# Patient Record
Sex: MALE | Race: WHITE | NOT HISPANIC OR LATINO | URBAN - METROPOLITAN AREA
[De-identification: names, ages, dates, MRNs, and addresses within clinical notes are randomized per-mention and may not be internally consistent; named-entity substitution may affect disease eponyms.]

---

## 2017-08-07 ENCOUNTER — OFFICE (OUTPATIENT)
Dept: URBAN - METROPOLITAN AREA CLINIC 67 | Facility: CLINIC | Age: 52
End: 2017-08-07

## 2017-08-07 VITALS
HEART RATE: 82 BPM | WEIGHT: 220 LBS | HEIGHT: 72 IN | SYSTOLIC BLOOD PRESSURE: 118 MMHG | DIASTOLIC BLOOD PRESSURE: 76 MMHG

## 2017-08-07 DIAGNOSIS — Z12.11 SCREENING FOR COLON CANCER: ICD-10-CM

## 2017-08-07 PROCEDURE — 99203 OFFICE O/P NEW LOW 30 MIN: CPT | Performed by: INTERNAL MEDICINE

## 2017-08-07 NOTE — SERVICEHPINOTES
I had the pleasure of seeing this patient today.As you know, he is a 52-year-old man, asymptomatic, who comes to see me for screening colonoscopy.He denies any nausea, vomiting, hematemesis, melena, hematochezia, or unintentional weight loss.

## 2020-01-02 ENCOUNTER — OFFICE (OUTPATIENT)
Dept: URBAN - METROPOLITAN AREA CLINIC 67 | Facility: CLINIC | Age: 55
End: 2020-01-02

## 2020-01-02 VITALS — SYSTOLIC BLOOD PRESSURE: 120 MMHG | HEIGHT: 72 IN | WEIGHT: 229 LBS | DIASTOLIC BLOOD PRESSURE: 80 MMHG

## 2020-01-02 DIAGNOSIS — R10.13 EPIGASTRIC PAIN: ICD-10-CM

## 2020-01-02 PROCEDURE — 99243 OFF/OP CNSLTJ NEW/EST LOW 30: CPT | Performed by: NURSE PRACTITIONER

## 2020-01-02 NOTE — SERVICEHPINOTES
Patient is a 55 yo male patient who apparently went to an ER   3-4 months ago to be evaluated for SOB was told he has GERD. Was commenced on a PPI with subsequent dose escalation per PCP's recommendations. Has been on 40 mg omeprazole for 3 months. Continues to report intermittent epigastric pain lasting 2-3 days at a time despite PPIs and diet modifications. Denies N/V, black stools, wt loss. Previously seen for a screening colonoscopy in 2017 6 mm splenic flexure polyp removed by bx path failed to reveal polyp or adenoma.

## 2020-01-03 LAB
HEPATIC FUNCTION PANEL: ALBUMIN/GLOBULIN RATIO: 1.6 (CALC) (ref 1–2.5)
HEPATIC FUNCTION PANEL: ALBUMIN: 4.7 G/DL (ref 3.6–5.1)
HEPATIC FUNCTION PANEL: ALKALINE PHOSPHATASE: 73 U/L (ref 40–115)
HEPATIC FUNCTION PANEL: ALT: 36 U/L (ref 9–46)
HEPATIC FUNCTION PANEL: AST: 21 U/L (ref 10–35)
HEPATIC FUNCTION PANEL: BILIRUBIN, DIRECT: 0.1 MG/DL (ref ?–0.2)
HEPATIC FUNCTION PANEL: BILIRUBIN, INDIRECT: 0.9 MG/DL (CALC) (ref 0.2–1.2)
HEPATIC FUNCTION PANEL: BILIRUBIN, TOTAL: 1 MG/DL (ref 0.2–1.2)
HEPATIC FUNCTION PANEL: GLOBULIN: 3 G/DL (CALC) (ref 1.9–3.7)
HEPATIC FUNCTION PANEL: PROTEIN, TOTAL: 7.7 G/DL (ref 6.1–8.1)
LIPASE: 25 U/L (ref 7–60)

## 2020-01-28 ENCOUNTER — AMBULATORY SURGICAL CENTER (OUTPATIENT)
Dept: URBAN - METROPOLITAN AREA SURGERY 42 | Facility: SURGERY | Age: 55
End: 2020-01-28

## 2020-01-28 VITALS
DIASTOLIC BLOOD PRESSURE: 76 MMHG | TEMPERATURE: 98.4 F | RESPIRATION RATE: 18 BRPM | HEIGHT: 72 IN | SYSTOLIC BLOOD PRESSURE: 131 MMHG | HEART RATE: 87 BPM | OXYGEN SATURATION: 97 % | WEIGHT: 200 LBS

## 2020-01-28 DIAGNOSIS — K29.70 GASTRITIS, UNSPECIFIED, WITHOUT BLEEDING: ICD-10-CM

## 2020-01-28 DIAGNOSIS — K44.9 DIAPHRAGMATIC HERNIA WITHOUT OBSTRUCTION OR GANGRENE: ICD-10-CM

## 2020-01-28 LAB — SURGICAL: PDF REPORT: (no result)

## 2020-01-28 PROCEDURE — 43239 EGD BIOPSY SINGLE/MULTIPLE: CPT | Performed by: INTERNAL MEDICINE

## 2020-01-28 NOTE — SERVICEHPINOTES
Patient is a 53 yo male patient who apparently went to an ER 3-4 months ago to be evaluated for SOB was told he has GERD. Was commenced on a PPI with subsequent dose escalation per PCP's recommendations. Has been on 40 mg omeprazole for 3 months. Continues to report intermittent epigastric pain lasting 2-3 days at a time despite PPIs and diet modifications. Denies N/V, black stools, wt loss. Previously seen for a screening colonoscopy in 2017 6 mm splenic flexure polyp removed by bx path failed to reveal polyp or adenoma.

## 2020-02-06 ENCOUNTER — OFFICE (OUTPATIENT)
Dept: URBAN - METROPOLITAN AREA CLINIC 67 | Facility: CLINIC | Age: 55
End: 2020-02-06

## 2020-02-06 VITALS — HEIGHT: 72 IN | SYSTOLIC BLOOD PRESSURE: 110 MMHG | WEIGHT: 230 LBS | DIASTOLIC BLOOD PRESSURE: 80 MMHG

## 2020-02-06 DIAGNOSIS — K21.9 GERD: ICD-10-CM

## 2020-02-06 DIAGNOSIS — K22.70 BARRETT'S ESOPHAGUS: ICD-10-CM

## 2020-02-06 PROCEDURE — 99213 OFFICE O/P EST LOW 20 MIN: CPT

## 2020-02-06 NOTE — SERVICEHPINOTES
I had the pleasure of seeing this patient today.As you know, he is a 54-year-old man with a history of reflux disease who underwent upper endoscopy by me on January 28, 2020. 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 The endoscopy also revealed a small hiatal hernia. The patient comes to see me today quite concerned about these findings and wants to have a discussion regarding him.He denies any nausea, vomiting, hematemesis, melena, hematochezia, or unintentional weight loss.

## 2023-03-22 ENCOUNTER — OFFICE (OUTPATIENT)
Dept: URBAN - METROPOLITAN AREA CLINIC 67 | Facility: CLINIC | Age: 58
End: 2023-03-22

## 2023-03-22 VITALS — DIASTOLIC BLOOD PRESSURE: 80 MMHG | SYSTOLIC BLOOD PRESSURE: 120 MMHG | WEIGHT: 238 LBS | HEIGHT: 72 IN

## 2023-03-22 DIAGNOSIS — K22.70 BARRETT'S ESOPHAGUS: ICD-10-CM

## 2023-03-22 DIAGNOSIS — G47.33 OSA (OBSTRUCTIVE SLEEP APNEA): ICD-10-CM

## 2023-03-22 DIAGNOSIS — K21.9 GERD: ICD-10-CM

## 2023-03-22 DIAGNOSIS — Z86.010 PERSONAL HISTORY COLON POLYPS: ICD-10-CM

## 2023-03-22 PROCEDURE — 99213 OFFICE O/P EST LOW 20 MIN: CPT | Performed by: NURSE PRACTITIONER

## 2023-03-22 RX ORDER — PANTOPRAZOLE 40 MG/1
TABLET, DELAYED RELEASE ORAL
Qty: 30 | Status: ACTIVE
Start: 2023-03-22

## 2023-03-22 NOTE — SERVICEHPINOTES
JORGE L GIL   returns today for follow-up from last visit on   2/6/2020  .    Followed for a hx of GERD and was diagnosed w/ Knight's in 2020. A 3 yr surveillance was recommended. Also had a polyp on his screening colonoscopy in 2017, a 5 yr f/u was recommended- path wo polyp poss sampling error?
br
br   Asymptomatic on daily PPI. Also denies change in BH, rectal bleeding, wt loss. 
br
br Pt w/ cochlear implant, reads lips to communicate

## 2023-04-20 ENCOUNTER — AMBULATORY SURGICAL CENTER (OUTPATIENT)
Dept: URBAN - METROPOLITAN AREA SURGERY 42 | Facility: SURGERY | Age: 58
End: 2023-04-20

## 2023-04-20 VITALS
HEIGHT: 72 IN | DIASTOLIC BLOOD PRESSURE: 84 MMHG | OXYGEN SATURATION: 98 % | HEART RATE: 87 BPM | RESPIRATION RATE: 17 BRPM | WEIGHT: 215 LBS | TEMPERATURE: 97.9 F | SYSTOLIC BLOOD PRESSURE: 133 MMHG

## 2023-04-20 DIAGNOSIS — K22.89 OTHER SPECIFIED DISEASE OF ESOPHAGUS: ICD-10-CM

## 2023-04-20 DIAGNOSIS — Z86.010 PERSONAL HISTORY OF COLONIC POLYPS: ICD-10-CM

## 2023-04-20 PROBLEM — K64.1 SECOND DEGREE HEMORRHOIDS: Status: ACTIVE | Noted: 2023-04-20

## 2023-04-20 PROBLEM — K63.5 POLYP OF COLON: Status: ACTIVE | Noted: 2023-04-20

## 2023-04-20 LAB — SURGICAL: PDF REPORT: (no result)

## 2023-04-20 PROCEDURE — 45385 COLONOSCOPY W/LESION REMOVAL: CPT | Performed by: STUDENT IN AN ORGANIZED HEALTH CARE EDUCATION/TRAINING PROGRAM

## 2023-04-20 PROCEDURE — 43239 EGD BIOPSY SINGLE/MULTIPLE: CPT | Performed by: STUDENT IN AN ORGANIZED HEALTH CARE EDUCATION/TRAINING PROGRAM

## 2023-04-20 RX ORDER — PANTOPRAZOLE 40 MG/1
TABLET, DELAYED RELEASE ORAL
Qty: 30 | Status: ACTIVE
Start: 2023-03-22

## 2023-04-20 NOTE — SERVICEHPINOTES
JORGE L GIL returns today for follow-up from last visit on 2/6/2020. Followed for a hx of GERD and was diagnosed w/ Knight's in 2020. A 3 yr surveillance was recommended. Also had a polyp on his screening colonoscopy in 2017, a 5 yr f/u was recommended- path wo polyp poss sampling error?Asymptomatic on daily PPI. Also denies change in BH, rectal bleeding, wt loss.Pt w/ cochlear implant, reads lips to communicate  No

## 2023-10-16 ENCOUNTER — HOSPITAL ENCOUNTER (EMERGENCY)
Dept: HOSPITAL 54 - ER | Age: 58
Discharge: HOME | End: 2023-10-16
Payer: MEDICARE

## 2023-10-16 VITALS — OXYGEN SATURATION: 100 % | SYSTOLIC BLOOD PRESSURE: 131 MMHG | DIASTOLIC BLOOD PRESSURE: 96 MMHG | TEMPERATURE: 98.4 F

## 2023-10-16 VITALS — WEIGHT: 215 LBS | HEIGHT: 72 IN | BODY MASS INDEX: 29.12 KG/M2

## 2023-10-16 DIAGNOSIS — Z20.822: ICD-10-CM

## 2023-10-16 DIAGNOSIS — Z60.2: ICD-10-CM

## 2023-10-16 DIAGNOSIS — R06.02: Primary | ICD-10-CM

## 2023-10-16 DIAGNOSIS — K21.9: ICD-10-CM

## 2023-10-16 LAB
BASOPHILS # BLD AUTO: 0 K/UL (ref 0–0.2)
BASOPHILS NFR BLD AUTO: 0.4 % (ref 0–2)
BUN SERPL-MCNC: 15 MG/DL (ref 7–18)
CALCIUM SERPL-MCNC: 8.8 MG/DL (ref 8.5–10.1)
CHLORIDE SERPL-SCNC: 104 MMOL/L (ref 98–107)
CO2 SERPL-SCNC: 26 MMOL/L (ref 21–32)
CREAT SERPL-MCNC: 1 MG/DL (ref 0.6–1.3)
EOSINOPHIL # BLD AUTO: 0.1 K/UL (ref 0–0.7)
EOSINOPHIL NFR BLD AUTO: 1.5 % (ref 0–6)
ERYTHROCYTE [DISTWIDTH] IN BLOOD BY AUTOMATED COUNT: 13.5 % (ref 11.5–15)
GLUCOSE SERPL-MCNC: 120 MG/DL (ref 74–106)
HCT VFR BLD AUTO: 41 % (ref 39–51)
HGB BLD-MCNC: 14.3 G/DL (ref 13.5–17.5)
LYMPHOCYTES NFR BLD AUTO: 1.4 K/UL (ref 0.8–4.8)
LYMPHOCYTES NFR BLD AUTO: 30.4 % (ref 20–44)
MCH RBC QN AUTO: 31 PG (ref 26–33)
MCHC RBC AUTO-ENTMCNC: 35 G/DL (ref 31–36)
MCV RBC AUTO: 89 FL (ref 80–96)
MONOCYTES NFR BLD AUTO: 0.4 K/UL (ref 0.1–1.3)
MONOCYTES NFR BLD AUTO: 8 % (ref 2–12)
NEUTROPHILS # BLD AUTO: 2.8 K/UL (ref 1.8–8.9)
NEUTROPHILS NFR BLD AUTO: 59.7 % (ref 43–81)
PLATELET # BLD AUTO: 278 K/UL (ref 150–450)
POTASSIUM SERPL-SCNC: 3.4 MMOL/L (ref 3.5–5.1)
RBC # BLD AUTO: 4.61 MIL/UL (ref 4.5–6)
SODIUM SERPL-SCNC: 141 MMOL/L (ref 136–145)
WBC NRBC COR # BLD AUTO: 4.7 K/UL (ref 4.3–11)

## 2023-10-16 PROCEDURE — C9803 HOPD COVID-19 SPEC COLLECT: HCPCS

## 2023-10-16 SDOH — SOCIAL STABILITY - SOCIAL INSECURITY: PROBLEMS RELATED TO LIVING ALONE: Z60.2
